# Patient Record
Sex: MALE | Race: WHITE | NOT HISPANIC OR LATINO | Employment: FULL TIME | URBAN - METROPOLITAN AREA
[De-identification: names, ages, dates, MRNs, and addresses within clinical notes are randomized per-mention and may not be internally consistent; named-entity substitution may affect disease eponyms.]

---

## 2018-07-08 ENCOUNTER — OFFICE VISIT (OUTPATIENT)
Dept: URGENT CARE | Facility: MEDICAL CENTER | Age: 33
End: 2018-07-08

## 2018-07-08 VITALS
RESPIRATION RATE: 18 BRPM | WEIGHT: 178.2 LBS | HEART RATE: 68 BPM | OXYGEN SATURATION: 99 % | BODY MASS INDEX: 24.14 KG/M2 | TEMPERATURE: 98.1 F | SYSTOLIC BLOOD PRESSURE: 118 MMHG | HEIGHT: 72 IN | DIASTOLIC BLOOD PRESSURE: 64 MMHG

## 2018-07-08 DIAGNOSIS — J01.00 ACUTE MAXILLARY SINUSITIS, RECURRENCE NOT SPECIFIED: Primary | ICD-10-CM

## 2018-07-08 RX ORDER — AMOXICILLIN AND CLAVULANATE POTASSIUM 875; 125 MG/1; MG/1
1 TABLET, FILM COATED ORAL EVERY 12 HOURS SCHEDULED
Qty: 14 TABLET | Refills: 0 | Status: SHIPPED | OUTPATIENT
Start: 2018-07-08 | End: 2018-07-15

## 2018-07-08 NOTE — PATIENT INSTRUCTIONS
1  Take Augmentin 875mg  1 tablet twice daily x 10 days  2  Continue nasal rinses and Flonase  3   Follow up with PCP in 3-5 days if symptoms persist

## 2018-07-08 NOTE — PROGRESS NOTES
[facility]        NAME: Belvin Schwab is a 29 y o  male  : 1985    MRN: [id number]  DATE: 2018  TIME: 7:52 PM    Assessment and Plan   Acute maxillary sinusitis, recurrence not specified [J01 00]  1  Acute maxillary sinusitis, recurrence not specified  amoxicillin-clavulanate (AUGMENTIN) 875-125 mg per tablet         Patient Instructions     1  Take Augmentin 875mg  1 tablet twice daily x 10 days  2  Continue nasal rinses and Flonase  3  Follow up with PCP in 3-5 days if symptoms persist        Chief Complaint     Chief Complaint   Patient presents with    Facial Pain         History of Present Illness       The patient is a 30-year-old male who presents with a 7 day history of increasing nasal discharge, sinus pressure and headache  His symptoms started initially with an upper respiratory infection with a headache, sinus pressure and discharge increased over the past 4 days  Patient has been using daily nasal rinse his and Flonase with no improvement of his symptoms  Review of Systems   Review of Systems   Constitutional: Negative  HENT: Positive for congestion, sinus pain and sinus pressure  Respiratory: Negative for cough            Current Medications       Current Outpatient Prescriptions:     amoxicillin-clavulanate (AUGMENTIN) 875-125 mg per tablet, Take 1 tablet by mouth every 12 (twelve) hours for 7 days, Disp: 14 tablet, Rfl: 0    Current Allergies     Allergies as of 2018 - Reviewed 2018   Allergen Reaction Noted    Sulfa antibiotics  2018            The following portions of the patient's history were reviewed and updated as appropriate: allergies, current medications, past family history, past medical history, past social history, past surgical history and problem list      Past Medical History:   Diagnosis Date    No known problems        Past Surgical History:   Procedure Laterality Date    KNEE CARTILAGE SURGERY      SINUS SURGERY No family history on file  Medications have been verified  Objective   /64   Pulse 68   Temp 98 1 °F (36 7 °C) (Temporal)   Resp 18   Ht 6' (1 829 m)   Wt 80 8 kg (178 lb 3 2 oz)   SpO2 99%   BMI 24 17 kg/m²        Physical Exam     Physical Exam   Constitutional: He appears well-developed and well-nourished  No distress  HENT:   Head: Normocephalic and atraumatic  Right Ear: Tympanic membrane normal    Left Ear: Tympanic membrane normal    Nose: Mucosal edema and rhinorrhea present  Right sinus exhibits maxillary sinus tenderness  Cardiovascular: Normal rate, regular rhythm and normal heart sounds  No murmur heard    Pulmonary/Chest: Effort normal and breath sounds normal

## 2019-03-18 ENCOUNTER — NURSE TRIAGE (OUTPATIENT)
Dept: PHYSICAL THERAPY | Facility: OTHER | Age: 34
End: 2019-03-18

## 2019-03-18 DIAGNOSIS — M54.42 ACUTE BILATERAL LOW BACK PAIN WITH LEFT-SIDED SCIATICA: Primary | ICD-10-CM

## 2019-03-18 DIAGNOSIS — M25.552 ACUTE HIP PAIN, LEFT: ICD-10-CM

## 2019-03-18 NOTE — TELEPHONE ENCOUNTER
Background - Initial Assessment  Clinical complaint: Lower back pain with pain traveling to L hip  Date of onset: One month  Mechanism of injury: Unknown onset of injury    Previous Treatment - Previous Treatment  Previous evaluation: No prior treatment  Current provider: No pcp  Diagnostics: No imaging  Previous treatment: Home stretching    Protocols used: [facility] COMPREHENSIVE SPINE PROGRAM PROTOCOL    Patient complains of acute lower back pain with L hip pain and some radicular symptoms of the left side/leg for one month  Patient denies injury or any known cause of pain  Patient has not had any prior evaluations or treatment for his back and does not have a PCP  Patient reports pain is most severe when he gets up in the morning out of bed, or when he sits for a prolonged period of time at his desk at work  Patient does not report improvement with ambulation, but states that the pain is not as severe as when he sits  Patient relays that it is painful to bend and dress, and that he is more cautious and aware of his pain relating to his normal activities  RN provided patient an overview of the [facility] including: triage assessment, physical therapy, and additional specialist referral options based on symptoms and chronicity  RN explained that [facility] program is physical therapy based with the goal of getting the patient scheduled in 24 - 48 hrs  Further explained that we schedule patients for a consultation with one of our advanced spine physical therapists for evaluation which may include treatment  These therapists have their doctorate in PTx  If needed, a telemed visit could occur at the same time of this consultation if muscle relaxers or a higher dose NSAID is needed  The goal is to get patients treated as quickly as possible so they can return to their normal activities    Research has shown great results when starting physical therapy sooner than later which helps reduce imaging and costs to patients

## 2019-03-19 ENCOUNTER — EVALUATION (OUTPATIENT)
Dept: PHYSICAL THERAPY | Facility: CLINIC | Age: 34
End: 2019-03-19
Payer: COMMERCIAL

## 2019-03-19 VITALS — HEART RATE: 67 BPM | TEMPERATURE: 98.4 F | SYSTOLIC BLOOD PRESSURE: 110 MMHG | DIASTOLIC BLOOD PRESSURE: 74 MMHG

## 2019-03-19 DIAGNOSIS — M54.50 ACUTE BILATERAL LOW BACK PAIN WITHOUT SCIATICA: Primary | ICD-10-CM

## 2019-03-19 PROCEDURE — 97161 PT EVAL LOW COMPLEX 20 MIN: CPT | Performed by: PHYSICAL THERAPIST

## 2019-03-19 NOTE — PROGRESS NOTES
PT Evaluation     Today's date: 3/19/2019  Patient name: Aida Constantino  : 1985  MRN: [id number]  Referring provider: Amy Bhagat MD  Dx:   Encounter Diagnosis     ICD-10-CM    1  Acute bilateral low back pain without sciatica M54 5                   Assessment  Assessment details: Patient is a 29 y o  male who presents to [place] [address] program with the above listed impairments  Patients fits into a hypomoiblity treatment category due to probable facet and SI joint dysfunction and would benefit from skilled PT services to address these impairments and to maximize function  Impairments: abnormal muscle firing, activity intolerance, impaired physical strength, lacks appropriate home exercise program, pain with function, poor posture  and poor body mechanics  Understanding of Dx/Px/POC: good   Prognosis: good    Goals  Impairment Goals  - Decrease pain by 50% in 2 weeks  - Increase bilateral flexibility by 50% in 2 weeks  Functional Goals  - Return to Prior Level of Function in 3 weeks  - Patient will be independent with HEP in 3 weeks      Plan  Patient would benefit from: skilled physical therapy  Planned modality interventions: cryotherapy, thermotherapy: hydrocollator packs and TENS  Planned therapy interventions: home exercise program, graded exercise, functional ROM exercises, flexibility, body mechanics training, postural training, patient education, therapeutic activities, therapeutic exercise, manual therapy, joint mobilization and neuromuscular re-education  Frequency: 2x week  Duration in weeks: 4  Treatment plan discussed with: patient and PCP        Subjective Evaluation    History of Present Illness  Mechanism of injury: Patient reports ~5 weeks ago he started to have LBP  He denies any ITALIA  He notes he got out of bed one morning and noted LBP  He reports his pain is random and it not sure of what makes his pain better or worse    He he notes his pain will radiate into his hip and to his "tailbone"  He notes he is able to work out, sometimes with pain and sometimes without pain  He denies an tingling or numbness  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   Pain  Current pain ratin  At best pain ratin  At worst pain ratin  Location: LBP  Quality: dull ache, tight and sharp    Patient Goals  Patient goals for therapy: decreased pain and independence with ADLs/IADLs          Objective     Concurrent Complaints  Negative for bladder dysfunction, bowel dysfunction and saddle (S4) numbness    Additional Special Questions  Unexplained Weight Loss: NO  Fever: NO  Urine Retention: NO  Acute Drop Foot or Paralysis: NO  Major Trauma (fall from height, high speed collision, direct blow to spine): NO              If yes; does patient have nausea, lightheadedness, or loss of                            Consciousness: NA        Tenderness     Additional Tenderness Details  No tenderness noted upon palpation  Neurological Testing     Sensation     Lumbar   Left   Intact: light touch    Right   Intact: light touch    Reflexes   Left   Patellar (L4): normal (2+)  Achilles (S1): normal (2+)    Right   Patellar (L4): normal (2+)  Achilles (S1): normal (2+)    Additional Neurological Details  LQS was negative  Active Range of Motion     Lumbar   Flexion:  WFL  Extension:  WFL and with pain  Left lateral flexion:  WFL and with pain  Right lateral flexion:  WFL and with pain  Left rotation:  WFL  Right rotation:  [facility]    Additional Active Range of Motion Details  -    Strength/Myotome Testing     Additional Strength Details  5/5 noted grossly BLE  Muscle Activation   Patient unable to activate left transverse abdominals, left multifidus, right transverse abdominals and right multifidus  Tests     Lumbar   Negative prone instability   Left   Negative crossed SLR and passive SLR  Right   Negative crossed SLR and passive SLR       Left Pelvic Girdle/Sacrum Negative: thigh thrust      Right Pelvic Girdle/Sacrum   Negative: thigh thrust      Left Hip   Positive long sit  Right Hip   Negative long sit  Additional Tests Details  Supine to sit test: + on the L for anterior rotation    Spring test: +  Hip IR ROM: 40 on the L and 45 on the R    Gowers Sign: negative  Akash's Sign: NT    Pt's hamstring length is 45 degrees from 0 when placed in 90/90 position  Piriformis muscle length is decreased  Juan Manuel Has test was + B, with the L more pronounced than the R        Flowsheet Rows      Most Recent Value   PT/OT G-Codes   Current Score  72   Projected Score  85          Daily Treatment Diary   Diagnosis: 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

## 2019-03-25 ENCOUNTER — OFFICE VISIT (OUTPATIENT)
Dept: PHYSICAL THERAPY | Facility: CLINIC | Age: 34
End: 2019-03-25
Payer: COMMERCIAL

## 2019-03-25 DIAGNOSIS — M54.50 ACUTE BILATERAL LOW BACK PAIN WITHOUT SCIATICA: Primary | ICD-10-CM

## 2019-03-25 PROCEDURE — 97140 MANUAL THERAPY 1/> REGIONS: CPT | Performed by: PHYSICAL THERAPIST

## 2019-03-25 NOTE — PROGRESS NOTES
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   Precautions:  NA   Manuals 3/19 3/25      Lumbar roll CMF       PA L SI joint CMF CMF      Mm energy L CMF CMF      IASTM  L glut CMF      Hip flexor S CMF       Exercise Diary        Hip flexor S Taught for HEP in standing                                                                                                                                                                       Modalities                                                       Assessment: Tolerated treatment well  Pt demonstrated 1/10 LBP after treatment today  Improved sxs noted with mm energy and rotation was correct  Pt was tender at the L glut medius with trigger points noted  Plan: Continue per plan of care

## 2019-03-28 ENCOUNTER — OFFICE VISIT (OUTPATIENT)
Dept: PHYSICAL THERAPY | Facility: CLINIC | Age: 34
End: 2019-03-28
Payer: COMMERCIAL

## 2019-03-28 DIAGNOSIS — M54.50 ACUTE BILATERAL LOW BACK PAIN WITHOUT SCIATICA: Primary | ICD-10-CM

## 2019-03-28 PROCEDURE — 97140 MANUAL THERAPY 1/> REGIONS: CPT | Performed by: PHYSICAL THERAPIST

## 2019-03-28 NOTE — PROGRESS NOTES
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   Precautions:  NA   Manuals 3/19 3/25 3/28     Lumbar roll CMF       PA L SI joint CMF CMF CMF     Mm energy L CMF CMF CMF     IASTM  L glut CMF CMF     Hip flexor S CMF       Exercise Diary        Hip flexor S Taught for HEP in standing                                                                                                                                                                       Modalities                                                       Assessment: Tolerated treatment well  Pt has no c/o pain when leaving PT today  Improved quad length noted during sofia test   Pt instructed to decreased stretch to 1x/daily  Plan: Continue per plan of care  Will f/u in 1 week

## 2019-05-22 NOTE — PROGRESS NOTES
PT Discharge    Today's date: 2019  Patient name: Lakia Schulte  : 1985  MRN: [id number]  Referring provider: Jearldine Goodell, MD  Dx:   Encounter Diagnosis     ICD-10-CM    1  Acute bilateral low back pain without sciatica M54 5        Start Time: 0750  Stop Time: 0810  Total time in clinic (min): 20 minutes    Assessment/Plan    Subjective    Objective        Pt did not return to PT   D/C at this time

## 2019-09-30 PROBLEM — J34.3 NASAL TURBINATE HYPERTROPHY: Chronic | Status: ACTIVE | Noted: 2019-09-30

## 2019-09-30 PROBLEM — J32.4 PANSINUSITIS: Chronic | Status: ACTIVE | Noted: 2019-09-30

## 2019-10-08 ENCOUNTER — HOSPITAL ENCOUNTER (OUTPATIENT)
Dept: RADIOLOGY | Facility: MEDICAL CENTER | Age: 34
Discharge: HOME | End: 2019-10-08
Payer: COMMERCIAL

## 2019-10-08 DIAGNOSIS — J01.41 ACUTE RECURRENT PANSINUSITIS: Chronic | ICD-10-CM

## 2019-10-08 PROCEDURE — 70486 CT MAXILLOFACIAL W/O DYE: CPT

## 2020-05-13 ENCOUNTER — TRANSCRIBE ORDERS (OUTPATIENT)
Dept: ADMINISTRATIVE | Facility: HOSPITAL | Age: 35
End: 2020-05-13

## 2020-05-13 ENCOUNTER — APPOINTMENT (OUTPATIENT)
Dept: LAB | Facility: MEDICAL CENTER | Age: 35
End: 2020-05-13
Payer: COMMERCIAL

## 2020-05-13 DIAGNOSIS — Z13.6 SCREENING FOR ISCHEMIC HEART DISEASE: ICD-10-CM

## 2020-05-13 DIAGNOSIS — J01.41 ACUTE RECURRENT PANSINUSITIS: ICD-10-CM

## 2020-05-13 DIAGNOSIS — Z00.00 ROUTINE GENERAL MEDICAL EXAMINATION AT A HEALTH CARE FACILITY: Primary | ICD-10-CM

## 2020-05-13 DIAGNOSIS — J01.40 ACUTE PANSINUSITIS, RECURRENCE NOT SPECIFIED: Primary | ICD-10-CM

## 2020-05-13 LAB
ALBUMIN SERPL BCP-MCNC: 4.1 G/DL (ref 3.5–5)
ALP SERPL-CCNC: 83 U/L (ref 46–116)
ALT SERPL W P-5'-P-CCNC: 23 U/L (ref 12–78)
ANION GAP SERPL CALCULATED.3IONS-SCNC: 4 MMOL/L (ref 4–13)
AST SERPL W P-5'-P-CCNC: 16 U/L (ref 5–45)
BILIRUB SERPL-MCNC: 2.7 MG/DL (ref 0.2–1)
BUN SERPL-MCNC: 15 MG/DL (ref 5–25)
CALCIUM SERPL-MCNC: 9.5 MG/DL (ref 8.3–10.1)
CHLORIDE SERPL-SCNC: 106 MMOL/L (ref 100–108)
CHOLEST SERPL-MCNC: 172 MG/DL (ref 50–200)
CO2 SERPL-SCNC: 29 MMOL/L (ref 21–32)
CREAT SERPL-MCNC: 1.01 MG/DL (ref 0.6–1.3)
GFR SERPL CREATININE-BSD FRML MDRD: 97 ML/MIN/1.73SQ M
GLUCOSE P FAST SERPL-MCNC: 94 MG/DL (ref 65–99)
HDLC SERPL-MCNC: 76 MG/DL
IGA SERPL-MCNC: 143 MG/DL (ref 70–400)
IGG SERPL-MCNC: 856 MG/DL (ref 700–1600)
IGM SERPL-MCNC: 101 MG/DL (ref 40–230)
LDLC SERPL CALC-MCNC: 85 MG/DL (ref 0–100)
NONHDLC SERPL-MCNC: 96 MG/DL
POTASSIUM SERPL-SCNC: 4 MMOL/L (ref 3.5–5.3)
PROT SERPL-MCNC: 6.9 G/DL (ref 6.4–8.2)
SODIUM SERPL-SCNC: 139 MMOL/L (ref 136–145)
TRIGL SERPL-MCNC: 55 MG/DL

## 2020-05-13 PROCEDURE — 80053 COMPREHEN METABOLIC PANEL: CPT | Performed by: FAMILY MEDICINE

## 2020-05-13 PROCEDURE — 82785 ASSAY OF IGE: CPT

## 2020-05-13 PROCEDURE — 82784 ASSAY IGA/IGD/IGG/IGM EACH: CPT

## 2020-05-13 PROCEDURE — 36415 COLL VENOUS BLD VENIPUNCTURE: CPT | Performed by: FAMILY MEDICINE

## 2020-05-13 PROCEDURE — 80061 LIPID PANEL: CPT | Performed by: FAMILY MEDICINE

## 2020-05-13 PROCEDURE — 82787 IGG 1 2 3 OR 4 EACH: CPT

## 2020-05-15 LAB
IGG SERPL-MCNC: 855 MG/DL (ref 603–1613)
IGG1 SER-MCNC: 314 MG/DL (ref 248–810)
IGG2 SER-MCNC: 391 MG/DL (ref 130–555)
IGG3 SER-MCNC: 69 MG/DL (ref 15–102)
IGG4 SER-MCNC: 47 MG/DL (ref 2–96)
TOTAL IGE SMQN RAST: 23.4 KU/L (ref 0–113)

## 2020-07-29 PROBLEM — J30.9 ALLERGIC RHINITIS: Status: ACTIVE | Noted: 2020-07-29

## 2020-07-29 PROBLEM — J32.9 SINUSITIS: Chronic | Status: ACTIVE | Noted: 2019-09-30

## 2020-07-29 PROBLEM — J01.91 ACUTE RECURRENT SINUSITIS: Status: ACTIVE | Noted: 2020-07-29

## 2020-08-19 PROBLEM — J30.81 ALLERGIC RHINITIS DUE TO CATS: Status: ACTIVE | Noted: 2020-08-19

## 2020-08-19 PROBLEM — M54.59 MECHANICAL LOW BACK PAIN: Status: ACTIVE | Noted: 2019-12-23

## 2020-08-19 PROBLEM — J30.89 ALLERGIC RHINITIS DUE TO HOUSE DUST MITE: Status: ACTIVE | Noted: 2020-08-19

## 2020-08-19 PROBLEM — E80.4 GILBERT'S DISEASE: Status: ACTIVE | Noted: 2019-12-23

## 2020-08-19 PROBLEM — H10.13 ALLERGIC CONJUNCTIVITIS OF BOTH EYES: Status: ACTIVE | Noted: 2020-08-19

## 2020-08-19 PROBLEM — J30.1 CHRONIC SEASONAL ALLERGIC RHINITIS DUE TO POLLEN: Status: ACTIVE | Noted: 2020-08-19

## 2020-08-19 PROBLEM — F41.9 ANXIETY: Status: ACTIVE | Noted: 2019-12-23

## 2020-10-27 ENCOUNTER — ANESTHESIA EVENT (OUTPATIENT)
Dept: PERIOP | Facility: HOSPITAL | Age: 35
End: 2020-10-27
Payer: COMMERCIAL

## 2020-10-28 ENCOUNTER — HOSPITAL ENCOUNTER (OUTPATIENT)
Facility: HOSPITAL | Age: 35
Setting detail: OUTPATIENT SURGERY
Discharge: HOME | End: 2020-10-28
Attending: OTOLARYNGOLOGY | Admitting: OTOLARYNGOLOGY
Payer: COMMERCIAL

## 2020-10-28 ENCOUNTER — ANESTHESIA (OUTPATIENT)
Dept: PERIOP | Facility: HOSPITAL | Age: 35
End: 2020-10-28
Payer: COMMERCIAL

## 2020-10-28 VITALS
OXYGEN SATURATION: 98 % | BODY MASS INDEX: 22.35 KG/M2 | HEIGHT: 72 IN | WEIGHT: 165 LBS | DIASTOLIC BLOOD PRESSURE: 60 MMHG | RESPIRATION RATE: 18 BRPM | HEART RATE: 69 BPM | SYSTOLIC BLOOD PRESSURE: 105 MMHG | TEMPERATURE: 98.2 F

## 2020-10-28 VITALS — HEART RATE: 66 BPM

## 2020-10-28 DIAGNOSIS — J34.3 NASAL TURBINATE HYPERTROPHY: Chronic | ICD-10-CM

## 2020-10-28 DIAGNOSIS — J01.41 ACUTE RECURRENT PANSINUSITIS: Primary | Chronic | ICD-10-CM

## 2020-10-28 PROCEDURE — 30802 ABLATE INF TURBINATE SUBMUC: CPT | Performed by: OTOLARYNGOLOGY

## 2020-10-28 PROCEDURE — C2625 STENT, NON-COR, TEM W/DEL SY: HCPCS | Performed by: OTOLARYNGOLOGY

## 2020-10-28 PROCEDURE — 31253 NSL/SINS NDSC TOTAL: CPT | Performed by: OTOLARYNGOLOGY

## 2020-10-28 PROCEDURE — 31256 EXPLORATION MAXILLARY SINUS: CPT | Performed by: OTOLARYNGOLOGY

## 2020-10-28 PROCEDURE — 61782 SCAN PROC CRANIAL EXTRA: CPT | Performed by: OTOLARYNGOLOGY

## 2020-10-28 DEVICE — PROPEL MINI SINUS IMPLANT
Type: IMPLANTABLE DEVICE | Site: NOSE | Status: FUNCTIONAL
Brand: PROPEL MINI

## 2020-10-28 RX ORDER — ACETAMINOPHEN 325 MG/1
650 TABLET ORAL EVERY 6 HOURS PRN
Status: DISCONTINUED | OUTPATIENT
Start: 2020-10-28 | End: 2020-10-28 | Stop reason: HOSPADM

## 2020-10-28 RX ORDER — HYDROMORPHONE HCL/PF 1 MG/ML
0.4 SYRINGE (ML) INJECTION
Status: DISCONTINUED | OUTPATIENT
Start: 2020-10-28 | End: 2020-10-28 | Stop reason: HOSPADM

## 2020-10-28 RX ORDER — FENTANYL CITRATE/PF 50 MCG/ML
25 SYRINGE (ML) INJECTION
Status: DISCONTINUED | OUTPATIENT
Start: 2020-10-28 | End: 2020-10-28 | Stop reason: HOSPADM

## 2020-10-28 RX ORDER — PROPOFOL 10 MG/ML
INJECTION, EMULSION INTRAVENOUS AS NEEDED
Status: DISCONTINUED | OUTPATIENT
Start: 2020-10-28 | End: 2020-10-28

## 2020-10-28 RX ORDER — MIDAZOLAM HYDROCHLORIDE 2 MG/2ML
INJECTION, SOLUTION INTRAMUSCULAR; INTRAVENOUS AS NEEDED
Status: DISCONTINUED | OUTPATIENT
Start: 2020-10-28 | End: 2020-10-28

## 2020-10-28 RX ORDER — GLYCOPYRROLATE 0.2 MG/ML
INJECTION INTRAMUSCULAR; INTRAVENOUS AS NEEDED
Status: DISCONTINUED | OUTPATIENT
Start: 2020-10-28 | End: 2020-10-28

## 2020-10-28 RX ORDER — IBUPROFEN 600 MG/1
600 TABLET ORAL EVERY 6 HOURS PRN
Qty: 30 TABLET | Refills: 0 | Status: SHIPPED | OUTPATIENT
Start: 2020-10-28

## 2020-10-28 RX ORDER — FENTANYL CITRATE 50 UG/ML
INJECTION, SOLUTION INTRAMUSCULAR; INTRAVENOUS AS NEEDED
Status: DISCONTINUED | OUTPATIENT
Start: 2020-10-28 | End: 2020-10-28

## 2020-10-28 RX ORDER — PROMETHAZINE HYDROCHLORIDE 25 MG/ML
25 INJECTION, SOLUTION INTRAMUSCULAR; INTRAVENOUS ONCE AS NEEDED
Status: DISCONTINUED | OUTPATIENT
Start: 2020-10-28 | End: 2020-10-28 | Stop reason: HOSPADM

## 2020-10-28 RX ORDER — SODIUM CHLORIDE, SODIUM LACTATE, POTASSIUM CHLORIDE, CALCIUM CHLORIDE 600; 310; 30; 20 MG/100ML; MG/100ML; MG/100ML; MG/100ML
75 INJECTION, SOLUTION INTRAVENOUS CONTINUOUS
Status: DISCONTINUED | OUTPATIENT
Start: 2020-10-28 | End: 2020-10-28 | Stop reason: HOSPADM

## 2020-10-28 RX ORDER — KETOROLAC TROMETHAMINE 30 MG/ML
INJECTION, SOLUTION INTRAMUSCULAR; INTRAVENOUS AS NEEDED
Status: DISCONTINUED | OUTPATIENT
Start: 2020-10-28 | End: 2020-10-28

## 2020-10-28 RX ORDER — LIDOCAINE HYDROCHLORIDE 10 MG/ML
INJECTION, SOLUTION EPIDURAL; INFILTRATION; INTRACAUDAL; PERINEURAL AS NEEDED
Status: DISCONTINUED | OUTPATIENT
Start: 2020-10-28 | End: 2020-10-28

## 2020-10-28 RX ORDER — ONDANSETRON 2 MG/ML
4 INJECTION INTRAMUSCULAR; INTRAVENOUS ONCE AS NEEDED
Status: DISCONTINUED | OUTPATIENT
Start: 2020-10-28 | End: 2020-10-28 | Stop reason: HOSPADM

## 2020-10-28 RX ORDER — DEXAMETHASONE SODIUM PHOSPHATE 10 MG/ML
INJECTION, SOLUTION INTRAMUSCULAR; INTRAVENOUS AS NEEDED
Status: DISCONTINUED | OUTPATIENT
Start: 2020-10-28 | End: 2020-10-28

## 2020-10-28 RX ORDER — ROCURONIUM BROMIDE 10 MG/ML
INJECTION, SOLUTION INTRAVENOUS AS NEEDED
Status: DISCONTINUED | OUTPATIENT
Start: 2020-10-28 | End: 2020-10-28

## 2020-10-28 RX ORDER — LIDOCAINE HYDROCHLORIDE AND EPINEPHRINE 10; 10 MG/ML; UG/ML
INJECTION, SOLUTION INFILTRATION; PERINEURAL AS NEEDED
Status: DISCONTINUED | OUTPATIENT
Start: 2020-10-28 | End: 2020-10-28 | Stop reason: HOSPADM

## 2020-10-28 RX ORDER — NEOSTIGMINE METHYLSULFATE 1 MG/ML
INJECTION INTRAVENOUS AS NEEDED
Status: DISCONTINUED | OUTPATIENT
Start: 2020-10-28 | End: 2020-10-28

## 2020-10-28 RX ORDER — EPINEPHRINE 1 MG/ML
INJECTION, SOLUTION, CONCENTRATE INTRAVENOUS AS NEEDED
Status: DISCONTINUED | OUTPATIENT
Start: 2020-10-28 | End: 2020-10-28 | Stop reason: HOSPADM

## 2020-10-28 RX ORDER — ONDANSETRON 2 MG/ML
INJECTION INTRAMUSCULAR; INTRAVENOUS AS NEEDED
Status: DISCONTINUED | OUTPATIENT
Start: 2020-10-28 | End: 2020-10-28

## 2020-10-28 RX ORDER — OXYMETAZOLINE HYDROCHLORIDE 0.05 G/100ML
SPRAY NASAL AS NEEDED
Status: DISCONTINUED | OUTPATIENT
Start: 2020-10-28 | End: 2020-10-28 | Stop reason: HOSPADM

## 2020-10-28 RX ADMIN — MIDAZOLAM HYDROCHLORIDE 2 MG: 1 INJECTION, SOLUTION INTRAMUSCULAR; INTRAVENOUS at 09:54

## 2020-10-28 RX ADMIN — NEOSTIGMINE METHYLSULFATE 5 MG: 1 INJECTION INTRAVENOUS at 12:12

## 2020-10-28 RX ADMIN — LIDOCAINE HYDROCHLORIDE 100 MG: 10 INJECTION, SOLUTION EPIDURAL; INFILTRATION; INTRACAUDAL at 09:58

## 2020-10-28 RX ADMIN — GLYCOPYRROLATE 1 MG: 0.2 INJECTION, SOLUTION INTRAMUSCULAR; INTRAVENOUS at 12:12

## 2020-10-28 RX ADMIN — FENTANYL CITRATE 25 MCG: 50 INJECTION INTRAMUSCULAR; INTRAVENOUS at 10:23

## 2020-10-28 RX ADMIN — PROPOFOL 100 MG: 10 INJECTION, EMULSION INTRAVENOUS at 10:00

## 2020-10-28 RX ADMIN — FENTANYL CITRATE 50 MCG: 50 INJECTION INTRAMUSCULAR; INTRAVENOUS at 09:55

## 2020-10-28 RX ADMIN — PROPOFOL 100 MG: 10 INJECTION, EMULSION INTRAVENOUS at 09:59

## 2020-10-28 RX ADMIN — FENTANYL CITRATE 25 MCG: 50 INJECTION INTRAMUSCULAR; INTRAVENOUS at 11:11

## 2020-10-28 RX ADMIN — FENTANYL CITRATE 50 MCG: 50 INJECTION INTRAMUSCULAR; INTRAVENOUS at 09:59

## 2020-10-28 RX ADMIN — FENTANYL CITRATE 25 MCG: 50 INJECTION INTRAMUSCULAR; INTRAVENOUS at 10:03

## 2020-10-28 RX ADMIN — FENTANYL CITRATE 25 MCG: 50 INJECTION INTRAMUSCULAR; INTRAVENOUS at 11:24

## 2020-10-28 RX ADMIN — SODIUM CHLORIDE, SODIUM LACTATE, POTASSIUM CHLORIDE, AND CALCIUM CHLORIDE: .6; .31; .03; .02 INJECTION, SOLUTION INTRAVENOUS at 09:38

## 2020-10-28 RX ADMIN — ONDANSETRON 4 MG: 2 INJECTION INTRAMUSCULAR; INTRAVENOUS at 10:01

## 2020-10-28 RX ADMIN — KETOROLAC TROMETHAMINE 30 MG: 30 INJECTION, SOLUTION INTRAMUSCULAR at 12:40

## 2020-10-28 RX ADMIN — ROCURONIUM BROMIDE 50 MG: 10 SOLUTION INTRAVENOUS at 09:59

## 2020-10-28 RX ADMIN — DEXAMETHASONE SODIUM PHOSPHATE 10 MG: 10 INJECTION, SOLUTION INTRAMUSCULAR; INTRAVENOUS at 10:01

## 2020-10-28 RX ADMIN — ACETAMINOPHEN 650 MG: 325 TABLET, FILM COATED ORAL at 14:27

## 2021-03-10 DIAGNOSIS — Z23 ENCOUNTER FOR IMMUNIZATION: ICD-10-CM

## (undated) DEVICE — GLOVE SRG BIOGEL 7.5

## (undated) DEVICE — ADHESIVE PAD 9732500XOM 25PK ENT

## (undated) DEVICE — INSTRUMENT TRACKER 9733533XOM ENT 1PK

## (undated) DEVICE — TUBING 1895522 5PK STRAIGHTSHOT TO XPS: Brand: STRAIGHTSHOT®

## (undated) DEVICE — SHEATH 1912000 5PK 4MM/0DEG STORZ XOMED: Brand: ENDO-SCRUB®

## (undated) DEVICE — STERILE NASAL PACK: Brand: CARDINAL HEALTH

## (undated) DEVICE — BLADE 1882040 5PK INFERIOR TURB 2MM

## (undated) DEVICE — ANTI-FOG SOLUTION WITH FOAM PAD: Brand: DEVON

## (undated) DEVICE — NEEDLE 25G X 1 1/2

## (undated) DEVICE — TUBING SUCTION 5MM X 12 FT

## (undated) DEVICE — 2000CC GUARDIAN II: Brand: GUARDIAN

## (undated) DEVICE — PATIENT TRACKER 9734887XOM NON-INVASIVE

## (undated) DEVICE — NEEDLE SPINAL 22G X 3.5IN  QUINCKE

## (undated) DEVICE — MAYO STAND COVER: Brand: CONVERTORS

## (undated) DEVICE — SPLINT INTRANASAL 0.6 X 2 IN POSISEP X

## (undated) DEVICE — NEURO PATTIES 1/2 X 3